# Patient Record
Sex: MALE | URBAN - METROPOLITAN AREA
[De-identification: names, ages, dates, MRNs, and addresses within clinical notes are randomized per-mention and may not be internally consistent; named-entity substitution may affect disease eponyms.]

---

## 2023-03-07 ENCOUNTER — OFFICE VISIT (OUTPATIENT)
Dept: URBAN - METROPOLITAN AREA CLINIC 113 | Facility: CLINIC | Age: 63
End: 2023-03-07

## 2023-03-07 NOTE — HPI-TODAY'S VISIT:
62-year-old male with a history of type 2 diabetes, hypertension is referred by Dr. Rae Narayanan for routine colon cancer screening.  A copy of today's visit will be forwarded to the referring provider. Patient does have a family history of colon cancer with mother and sister.  Patient is self-pay. Labs 12/30/2022:Hemoglobin A1c 6.5, unremarkable CBC, glucose 142, normal total bilirubin, normal ALP, normal AST, ALT 46.

## 2023-04-04 ENCOUNTER — OFFICE VISIT (OUTPATIENT)
Dept: URBAN - METROPOLITAN AREA CLINIC 113 | Facility: CLINIC | Age: 63
End: 2023-04-04

## 2023-07-25 ENCOUNTER — OFFICE VISIT (OUTPATIENT)
Dept: URBAN - METROPOLITAN AREA CLINIC 113 | Facility: CLINIC | Age: 63
End: 2023-07-25